# Patient Record
(demographics unavailable — no encounter records)

---

## 2024-10-29 NOTE — PLAN
[FreeTextEntry3] : 1. Requested: -IEP- school service plan -Early Intervention initial evaluation reports:  Psychological, Developmental, Speech therapy, Occupational therapy, Physical therapy, etc -CPSE (Committee on  Special Education)/ school district initial evaluation reports: Psychological, Developmental/Educational, Speech therapy, Occupational therapy, Physical therapy, etc -Teacher comments and Teacher ECI form -Parent ECI form  2. Next visit: -relisten as murmur heard today - if heard again can follow up w/ PMD -review requested info  3. Discussed: -noted some face scrunching and lining up of toys - monitor next visit if scrunching attn seeking/being cute/silly or not  4. Following completion of this two part initial consultation, a consultation report will be completed and sent out to the child's pediatrician and the child's parents.

## 2024-10-29 NOTE — REASON FOR VISIT
[FreeTextEntry1] :   Introduction: I had the pleasure of seeing aRzia "Constanza Young, age ~3.2 years, for a consultation.  Razia was accompanied today by her mother.  HISTORY OF PRESENTING CONCERNS:  Concerns noted on our intake paperwork include: -possible anxiety or ADHD -speech delays and explosive tantrums -appears very anxious -hard to calm/soothe -struggles with express communication and makes needs known -anxiety, diff transitioning, emotional regulation  Today's Concerns: -global delays -low muscle tone - multiple nursemaid elbows -speech delays -anxiety/OCD type behaviors -no concern for  ASD -low tone and Anxiety -developmental monitoring given fmhx  Anxiety concern: -at night during nighttime routine wanted to ask everyone's eye color in the family or hair color or hair color - stopped -if routine changed repeatedly asks about it, anticipatory nervousness - if already experiences would be calmer/fewer questions -if doesn't want to do something hard to persuade - doesn't necc seem extreme -entered school ok first day -no rituals  ADHD concern:   Sensory seeking    Language/Communication: -EI started at 24 months, she had ~5 words at the time -speaks in sentences -able to follow multiple step instruction -demonstrates shared enjoyment and joint attention -some repeating/quoting others during her play - repeating things heard from others, also novel narrating  Behavioral: -very stubborn, independent -generally cooperative if ask in the 'right way' -meltdowns improving overtime  Emotional: -picks up on other's emotions - asks/comments about it  Social/Play Skills: -strong social interest, enjoys playing with older kids -usually plays with toys in their intended manner -frequent upset when playing with siblings, plays nicely by herself when alone -with similarly aged peers - plays interactively and goes well -very socially engaged/cued in -thoughtful (asked for 4 stickers end of visit so can give one to each sibling)  Atypical Behaviors/Sensory: -frequent face scrunching - parent thinks likely to be silly -generally doesn't line toys up (during visit first lined up all the  toys - mother notes doesn't typically do) -demonstrates pretend/imaginative play. Likes dresses   ADAPTIVE FUNCTIONING:  Self-Care: -helps w/ dressing process, can partly dress herself -good w/ utensils, drinks from a cup  Toileting: -working on in school  Feeding: -fair variety. No major brand issues  Sleep: -no major issues -asleep around 8:00pm, wakes around 6:30am. No snoring.  EDUCATIONAL HISTORY/INTERVENTIONS: Academics: knows some letters and numbers. Knows colors and shapes. Sings alphabet song.   Prior to 9/2024 was watched at home with a   School Name: [employer] (9/2024-) Grade: PreK Services: -SC 12:1 -SLT  Approved for 15 person class, but they didn't have one. CPSE wanted to have her in [place] with SEIT, parent opted for more restrictive 12:1  PREVIOUS ASSESSMENTS/SERVICES:  *EI (1 yo): -PT -OT -SLT  *CPSE (5/2024 evaluated):  -qualified for 15:1, placed in 12:1 as was available -SLT -mother planning on having her reevaluated for OT and PT  MEDICAL HISTORY:  Current Medications: -none  Medication History: -none for behavior  Allergies: -none  Birth History: -FT, vaginal delivery, no major complications  ROS: A 10-point review of systems was performed. No concerns regarding vision and hearing. No cardiovascular, respiratory, gastrointestinal, renal, endocrine, or neurologic concerns. -hypotonia, and ligamentous laxity -eczema  Ortho (6/2024): -ligamentous laxity  Audiology: -screened by PMD  Vision: -no concerns   Hospitalizations/ Surgeries: -none   FAMILY HISTORY: Her father is an . Has a Masters Her mother is a PA - [employer] - Internal Medicine She has a sister (2017) and 2 brothers (2019)  There is a family history/extended family history of: -ADHD: father, multiple family members -high functioning ASD: twin brothers (2019) -APD/ADHD sxs: sister  -Dyslexia: MGF  -Anxiety: mother and addtl family members -APD symptoms: maternal aunt -Asperger symptoms: some symptoms MGF and MGGF -Cardiomyopathy: MGM - asymptomatic, genetic testing negative -Atypicalities on EKG: mother - monitoring  There is no family history/extended family history of depression, , OCD, schizophrenia, bipolar disorder, intellectual disability learning disabilities  SOCIAL HISTORY: -lives with her immediate family   PE (10/29/24): Constitutional: Well appearing. No acute distress. Dysmorphic Features: No dysmorphic features noted. Skin: 1 large hyperpigmented macule right lower leg lateral Eyes: Pupils, equal, round, reactive to light. Extraocular movements grossly intact. Ear, Nose, Mouth, Throat: Moist mucous membranes. No pharyngeal injection. Normal appearing uvula. Somewhat high palate. Cardiovascular: S1,S2. Flow murmur lower left lateral Respiratory: Clear to auscultation bilaterally. Gastrointestinal: Soft, non-tender, non-distended. No hepatosplenomegaly. Normoactive bowel sounds. Genitourinary:  Normal appearing female external genitalia. Musculoskeletal/Neuro: fair strength, FROM of extremities. Decreased tone Motor: inturning of feet  Observations (10/29/24 ): -fairly robust shared enjoyment -demonstrated fair eye contact and joint attention -initially lined up all the toys but then played with them (mother reports generally doesn't line up items) -fairly frequent face scrunching - at time seemingly for attn/being cute -cooperative, friendly -followed my gaze -fair play with toys -no scripting/echolalia appreciated -no visual inspection noted -spoke in sentences, some articulation weaknesses (says Elton for her sister when her name is Nakia) -followed instructions fairly -responsive to social  bids -social rapport established   * LABORATORY/TEST RESULTS/DEVELOPMENTAL ASSESSMENTS:  [place] Assessment  -Informant: Caregiver on Intake Form -Inattention: 4/9 -Hyperactivity/Impulsivity: 8/9

## 2024-11-19 NOTE — REASON FOR VISIT
[FreeTextEntry1] :   Introduction: I had the pleasure of seeing Razia "Constanza Young, age ~3.2 years, for a consultation.  Razia was accompanied today by her parents.  HISTORY OF PRESENTING CONCERNS:  Concerns noted on our intake paperwork include: -possible anxiety or ADHD -speech delays and explosive tantrums -appears very anxious -hard to calm/soothe -struggles with express communication and making needs known -anxiety, diff transitioning, emotional regulation  Today's Concerns: -global delays -low muscle tone - multiple nursemaid elbows -speech delays -anxiety/OCD type behaviors -no concern for ASD -low tone and Anxiety -developmental monitoring given fmhx  Anxiety concern: -at night during nighttime routine wanted to ask everyone's eye color in the family or hair color - stopped -if routine changed repeatedly asks about it, anticipatory nervousness - if already experiences would be calmer/fewer questions -if doesn't want to do something hard to persuade - doesn't necc seem extreme -entered school ok first day -no rituals -whatever mood she wakes in the morning - hard to change -ruminates on thoughts/things she wants - either parents give in or she tantrums - which can long. Likes going to specific stores - [employer] - and then often gets a toy/treat there - if denied something generally ok - or if given options of small items instead of a larger toy she is usually receptive. Difficulty delaying her wanting to go there -Requests certain food - takes a bite, and then doesn't want the rest  ADHD concern: -often changes from activity to activity -Mealtime: can sit for breakfast -Impulsive: breaking sibling toys, hitting/bothering siblings, extreme impulsivity -not overly hyperactivity -easily distracted  Sensory: -likes slime, water, sand -asks for a bath when in a bad mood -dislikes washing / shampooing hair/brushing -cut hair to avoid the issues  - now cut the hair, now much better - she sprays her hair and picks clips to put in the hair -frequent changing into PJs. Changes clothing frequently as doesn't like it wet or dirty -dislikes loud noises, dislikes large crowds -sensitive to smells  School: -doing fairly well behaviorally, academically, and socially  Language/Communication: -EI started at 24 months, she had ~5 words at the time -speaks in sentences -able to follow multiple step instruction -demonstrates shared enjoyment and joint attention -some repeating/quoting others during her play - repeating things heard from others, also novel narrating  Behavioral: -very stubborn, independent -generally cooperative if ask in the 'right way' -meltdowns improving overtime  Emotional: -picks up on other's emotions - asks/comments about it  Social/Play Skills: -strong social interest, enjoys playing with older kids -usually plays with toys in their intended manner -frequent upset when playing with siblings, plays nicely by herself when alone -with similarly aged peers - plays interactively and goes well -very socially engaged/cued in -thoughtful (asked for 4 stickers end of visit so can give one to each sibling)  Atypical Behaviors/Sensory: -frequent face scrunching - parent thinks likely to be silly -generally doesn't line toys up (during visit first lined up all the  toys - mother notes doesn't typically do) -demonstrates pretend/imaginative play. Likes dresses   ADAPTIVE FUNCTIONING:  Self-Care: -helps w/ dressing process, can partly dress herself -good w/ utensils, drinks from a cup  Toileting: -working on in school  Feeding: -fair variety. No major brand issues  Sleep: -no major issues -asleep around 8:00pm, wakes around 6:30am. No snoring.  EDUCATIONAL HISTORY/INTERVENTIONS: Academics: knows some letters and numbers. Knows colors and shapes. Sings alphabet song.   Prior to 2024 was watched at home with a   School Name: [employer] (2024-) Grade: PreK Services: -SC 12:1 -SLT -Parent training   Approved for 15 person class, but they didn't have one. CPSE wanted to have her in [place] with SEIT, parent opted for more restrictive 12:1  PREVIOUS ASSESSMENTS/SERVICES:  *EI (3 yo): -PT -OT -SLT  *CPSE (2024 evaluated):  -qualified for 15:1, placed in 12:1 as was available -SLT -mother planning on having her reevaluated for OT and PT  MEDICAL HISTORY:  Current Medications: -none  Medication History: -none for behavior  Allergies: -none  Birth History: -FT, vaginal delivery, no major complications  ROS: A 10-point review of systems was performed. No concerns regarding vision and hearing. No cardiovascular, respiratory, gastrointestinal, renal, endocrine, or neurologic concerns. -hypotonia, and ligamentous laxity -eczema  Ortho (2024): -ligamentous laxity  Audiology: -screened by PMD  Vision: -no concerns   Hospitalizations/ Surgeries: -none   FAMILY HISTORY: Her father is an . Has a Masters Her mother is a PA - [employer] - Internal Medicine She has a sister (2017) and 2 brothers (2019)  There is a family history/extended family history of: -ADHD: father, multiple family members -high functioning ASD: twin brothers (2019) -APD/ADHD sxs: sister  -Dyslexia: MGF  -Anxiety: mother and addtl family members -APD symptoms: maternal aunt -Asperger symptoms: some symptoms MGF and MGGF -Cardiomyopathy: MGM - asymptomatic, genetic testing negative -Atypicalities on EKG: mother - monitoring -Father: murmur when younger  There is no family history/extended family history of depression, , OCD, schizophrenia, bipolar disorder, intellectual disability learning disabilities  SOCIAL HISTORY: -lives with her immediate family   PE (10/29/24): Constitutional: Well appearing. No acute distress. Dysmorphic Features: No dysmorphic features noted. Skin: 1 large hyperpigmented macule right lower leg lateral Eyes: Pupils, equal, round, reactive to light. Extraocular movements grossly intact. Ear, Nose, Mouth, Throat: Moist mucous membranes. No pharyngeal injection. Normal appearing uvula. Somewhat high palate. Cardiovascular: S1,S2. Flow murmur lower left lateral Respiratory: Clear to auscultation bilaterally. Gastrointestinal: Soft, non-tender, non-distended. No hepatosplenomegaly. Normoactive bowel sounds. Genitourinary:  Normal appearing female external genitalia. Musculoskeletal/Neuro: fair strength, FROM of extremities. Decreased tone Motor: inturning of feet  Observations (10/29/24 ): -fairly robust shared enjoyment -demonstrated fair eye contact and joint attention -initially lined up all the toys but then played with them (mother reports generally doesn't line up items) -fairly frequent face scrunching - at time seemingly for attn/being cute -cooperative, friendly -followed my gaze -fair play with toys -no scripting/echolalia appreciated -no visual inspection noted -spoke in sentences, some articulation weaknesses (says Elton for her sister when her name is Nakia) -followed instructions fairly -responsive to social  bids -social rapport established  (2024): -lined up dinosaurs -less interested in playing today -perseverated on saying she was thirsty and wanted to go home and was tired (didn't sleep well prior night) -fair EC, JA, shared enjoyment -scrunched face but in a social manner -minor articulation weaknesses, spoke in sentences -playful at moments  * LABORATORY/TEST RESULTS/DEVELOPMENTAL ASSESSMENTS:  ***EI (~24 months, 2023): (observations of evaluations reviewed, in general no major red flags for ASD or other disorders noted on review of behavioral observations) __PES2: 	-all scores avg or above with exception of weaker Self-help skills __DAYC2: 	-all scores avg or above with exception of weaker adaptive and soc-emot skills __PLS5: 	-Recpt: 82 	-Expr: 74 	-Total lan __PDMS2: 	-FMQ: 67 	-GMQ: 76   ***CPSE Eval (~2.9 yo, 2024): *Psychological: -presented as slightly anxious -smiled at evaluator when greeted, transitioned well -engaging, cooperative, easily understand test instructions -appr EC -demonstrated pretend play -quickly gave up on  task when unsure of an answer __Wechsler  and Primary Scale of Intelligence-4th Ed (WPPSI-IV) (SS): 	-Verbal Comprehension Index (VCI): 103 	-Visual Spatial Index (VSI): 100 	-Working Memory Index (WMI): 113 	-Full Scale IQ:  105 *SLT: -articulation errors -moderate delays in articulation and oral motor skills -age appr expr/recept skills __Kaufman Speech Praxis Test:	 	-Oral Movement Level:  72 __Goldman Fristoe Test of Articulation 3: 	-Score:  76 __Preschool Language Scale-5 (PLS - 5)(SS)(SD of 15): 	-Auditory Comp: 87 	-Expressive Comm:  85 	-Total Language: 85 *OT: -visual and auditory distractibility noted in busy environment __PDMS2: 	-FMQ: 88 (Grasping 6, VMI 10) *PT: -established, maintained EC, avoided diff tasks by complaining she was tired __PDMS2: 	-GMQ: 89  [place] Assessment  -Informant: Caregiver on Intake Form -Inattention:  -Hyperactivity/Impulsivity:

## 2024-11-19 NOTE — PLAN
[FreeTextEntry3] : A.  1.  Follow up is offered in 6-12 months X 30min  Requested shortly prior to next visit: -IEP -any new evaluation reports -Annual review progress reports -Teacher comments and Teacher ECI form -Parent ECI form  2. Murmur: -cardiac murmur appreciated on exam -recc following up with PMD - can consider Cardiology consultation  3. Behavior: -consider working with parent training/behavioral therapist to address behavioral challenges. If significant anxiety overtime recommend working with a Cognitive Behavioral Therapist (abct.org) to address  4. School services: -reportedly doing well in her current setting -next year may benefit from a less restrictive environment - such as an integrated classroom or a general ed program with SEIT services  B. Anxiousness resources: Websites: -anxietybc.Zhanzuo  -Worrywisekids.org -childanxiety.net -Association for Behavioral & Cognitive Therapies (www.abct.org). -Anxiety Disorders Association of [place]:  www.adaa   Helpful resources for therapists and school counselors include: "Up and Down the Worry [place]" by Gertrude and "Modular Cognitive Behavioral Therapy for Childhood Anxiety Disorders" by Deepa.  Books for Families:  -"What to do When You Worry Too Much: A Kid's Guide to Overcoming Anxiety" by Ivis Pack  -"What to do When Your Brain Gets Stuck: A Kid's Guide to Overcoming OCD" by Ivis Pack and Natalie Felipe   "Worried No More: Help and Hope for Anxious Children" [place], [place]: A [facility] by Tripp Lindsey (2005).    "Teaching your Child the Language of Social Success" by Fei Ugalde Nowicki (1996).  "The New Social Story Book: Illustrated Edition" by Yvonne Michel (2000). www.theCentrePathaycenter.org  "Treating childhood and adolescent anxiety: A guide for caregivers." Nazario&Sons, [place], [place]. by Rosie Cassidy and Juni Kim. (2013).  "Freeing Your Child from Anxiety: Powerful, Practical Solutions to Overcome Your Child's Fears, Worries, and Phobias" by  Joyce Ladd (2004).  "Helping Your Anxious Child: A Step by Step Guide for Parents second edition" by Stalin Yoo, Nina Laureano, Anette Landrum Lyneham and Yue Espinal (2008)  "Think Good-Feel Good: A Cognitive Behavioral Workbook for Children" by David Garcia (2002).

## 2025-05-01 NOTE — DISCUSSION/SUMMARY
[FreeTextEntry1] : KEVIN has a normal cardiac exam, electrocardiogram and echocardiogram.  I reassured the patient and her  family that KEVIN's heart is structurally and functionally normal without any evidence of a cardiac abnormality.  I placed a 14 day Holter in the hope of capturing some of the described episodes and will f/u over the phone with the results. All physical activities may be performed without restriction and she should return at ~the time of puberty for further screening of HCM.   [Needs SBE Prophylaxis] : [unfilled] does not need bacterial endocarditis prophylaxis [May participate in all age-appropriate activities] : [unfilled] May participate in all age-appropriate activities.

## 2025-05-01 NOTE — HISTORY OF PRESENT ILLNESS
[FreeTextEntry1] : KEVIN is a 3 year female  who presents for cardiac evaluation in the setting of a family history of Hypertrophic cardiomyopathy, KEVIN's maternal grandmother was recently diagnosed with hypertrophic cardiomyopathy (genetics negative) but is clinically well and NIYAH's mother has a similar ECG as her mother with an MRI that revealed an area of fibrosis in the heart, though she was told that at this point she does not have hypertrophic cardiomyopathy. Interestingly, KEVIN's mother was seen by cardiology in her teens and had a normal ECG at that time (reviewed by me). KEVIN's mother states that she occasionally complains that her heart is  'beeping' in her chest, occurring primarily at home, multiple times per week. The episodes are characterized as fast and quiet, without associated pain. The palpitations do not seem to bother the patient or cause fear, but she does notice them. Lastly, on a recent evaluation by Dr. Benedict noted a heart murmur was heard which was felt to be an innocent murmur.

## 2025-05-01 NOTE — CARDIOLOGY SUMMARY
[Today's Date] : [unfilled] [FreeTextEntry1] : Normal sinus rhythm without preexcitation or ectopy. Heart rate (bpm): 86 [FreeTextEntry2] : 1. Normal left ventricular size, morphology and systolic function. 2. Normal right ventricular morphology with qualitatively normal size and systolic function. 3. No pericardial effusion.  [de-identified] : Placed for 2 weeks.

## 2025-05-01 NOTE — CONSULT LETTER
[Today's Date] : [unfilled] [Name] : Name: [unfilled] [] : : ~~ [Today's Date:] : [unfilled] [Dear  ___:] : Dear Dr. [unfilled]: [Consult] : I had the pleasure of evaluating your patient, [unfilled]. My full evaluation follows. [Consult - Single Provider] : Thank you very much for allowing me to participate in the care of this patient. If you have any questions, please do not hesitate to contact me. [Sincerely,] : Sincerely, [FreeTextEntry4] : JOSE F CARRASCO MD [de-identified] : Jose Galloway MD, FAAP, FACC  Pediatric Cardiologist  of Pediatrics [facility]

## 2025-05-01 NOTE — REASON FOR VISIT
[Initial Consultation] : an initial consultation for [Murmurs] : a murmur [Mother] : mother [Father] : father [Medical Records] : medical records [FreeTextEntry3] : Screening for Cardiovascular Disorders due to family history

## 2025-06-10 NOTE — REASON FOR VISIT
[FreeTextEntry1] :   I had the pleasure of meeting with Razia Young (Adira), for a follow up appointment. Accompanied today by her mother.    Introduction:     Initial Consultation Assessment (2024):   Razia Young (Adira), age ~3.2 years, presented for consultation for concerns regarding anxiety, low tone, developmental delays and developmental monitoring. Family history of Autism and Asperger symptoms.  Delays: -receiving CPSE services in a 12:1 [employer] classroom, making progress  Hypotonia noted  Anxiety concerns: -some mild anxiety symptoms reported  Behavior: -perseverates on things she wants, may tantrum if doesn't receive -some behavioral challenges -sensory issues  ADHD concern: -impulsive -easily distracted at times  Past testing (~2.9 yo) demonstrated average range cognitive abilities, low avg speech/language skills with weaknesses in articulation, and low average fine and gross motor skills.  Overall Gómez's presentation is consistent with the following:  *Childhood Communication Disorder -given articulation issues, although these are improving overtime  *Anxiousness: -some anxiousness although doesn't appear to reach level of a disorder at this time  *Hypotonia  *ADHD concern: -some ADHD symptoms. To continue to monitor as she ages  *Presentation does not appear consistent with Autism at this time. While there are some sensory issues and she can perseverate, she also demonstrates intact basic core social communication/interaction skills at this time. To continue to monitor        INTERIM HISTORY:    Anxiety concern: -at night during nighttime routine wanted to ask everyone's eye color in the family or hair color - stopped -if routine changed repeatedly asks about it, anticipatory nervousness - if already experiences would be calmer/fewer questions -if doesn't want to do something hard to persuade - doesn't necc seem extreme -entered school ok first day -no rituals -whatever mood she wakes in the morning - hard to change -ruminates on thoughts/things she wants - either parents give in or she tantrums - which can long. Likes going to specific stores - [employer] - and then often gets a toy/treat there - if denied something generally ok - or if given options of small items instead of a larger toy she is usually receptive. Difficulty delaying her wanting to go there -Requests certain food - takes a bite, and then doesn't want the rest __2025: -perseverates on anything she wants until gets it, gets stuck on it - no longer having related tantrums, gets stuck -School: quiet, shy, doesn't engage much in school -needs a lot of support and encouragement- doesn't greet someone if come into the class - very difft at home - much more outgoing   ADHD concern: -often changes from activity to activity -Mealtime: can sit for breakfast -Impulsive: breaking sibling toys, hitting/bothering siblings, extreme impulsivity -not overly hyperactivity -easily distracted __2025: -still very impulsive - in past explosive outbursts - but has improved  Sensory: -likes slime, water, sand -asks for a bath when in a bad mood -dislikes washing / shampooing hair/brushing -cut hair to avoid the issues - now cut the hair, now much better - she sprays her hair and picks clips to put in the hair -frequent changing into PJs. Changes clothing frequently as doesn't like it wet or dirty -dislikes loud noises, dislikes large crowds -sensitive to smells __2025: -sensitive to noises   School: __: -doing fairly well behaviorally, academically, and socially  Language/Communication: -EI started at 24 months, she had ~5 words at the time -speaks in sentences -able to follow multiple step instruction -demonstrates shared enjoyment and joint attention -some repeating/quoting others during her play - repeating things heard from others, also novel narrating __2025: -very expressive, complex thoughts can be explained -can reason with her more than in the past  Behavioral: -very stubborn, independent -generally cooperative if ask in the 'right way' -meltdowns improving overtime __2025: -fewer tantrums/outbursts  Emotional: -picks up on other's emotions - asks/comments about it  Social/Play Skills: -strong social interest, enjoys playing with older kids -usually plays with toys in their intended manner -frequent upset when playing with siblings, plays nicely by herself when alone -with similarly aged peers - plays interactively and goes well -very socially engaged/cued in -thoughtful (asked for 4 stickers end of visit so can give one to each sibling) __2025: -does well with playdates for hours, plays nicely   SCHOOL HISTORY/DEVELOPMENTAL SERVICES:  Academics: knows some letters and numbers. Knows colors and shapes. Sings alphabet song. __2025: doing well  Prior to 2024 was watched at home with a   School Name: [employer] (2024-) Grade: PreK Services: -SC 12:1 -SLT -Parent training -ESY  Approved for 15 person class, but they didn't have one. CPSE wanted to have her in [place] with SEIT, parent opted for more restrictive 12:1  Next year: -remain in [employer] - will be in Integrated 15:1 -likely adding counseling once a week  PREVIOUS ASSESSMENTS/SERVICES:  *EI (3 yo): -PT -OT -SLT  *CPSE (2024 evaluated): -qualified for 15:1, placed in 12:1 as was available -SLT -mother planning on having her reevaluated for OT and PT  CURRENT FUNCTIONING/HISTORY OF PRESENTING CONCERNS:  ******As noted during her initial consultation******  Concerns noted on our intake paperwork include: -possible anxiety or ADHD -speech delays and explosive tantrums -appears very anxious -hard to calm/soothe -struggles with express communication and making needs known -anxiety, diff transitioning, emotional regulation  Today's Concerns: -global delays -low muscle tone - multiple nursemaid elbows -speech delays -anxiety/OCD type behaviors -no concern for ASD -low tone and Anxiety -developmental monitoring given fmhx   Atypical Behaviors/Sensory: -frequent face scrunching - parent thinks likely to be silly -generally doesn't line toys up (during visit first lined up all the toys - mother notes doesn't typically do) -demonstrates pretend/imaginative play. Likes dresses   ADAPTIVE FUNCTIONING:  Self-Care: -helps w/ dressing process, can partly dress herself -good w/ utensils, drinks from a cup  Toileting: -working on in school  Feeding: -fair variety. No major brand issues  Sleep: -no major issues -asleep around 8:00pm, wakes around 6:30am. No snoring.   *************   *MEDICAL HISTORY:    Current Medications: -none  Medication History: -none for behavior  Allergies: -none   UPDATED PAST MEDICAL HISTORY:  There have been no recent major medical changes.    Birth History: -FT, vaginal delivery, no major complications  ROS: A 10-point review of systems was performed. No concerns regarding vision and hearing. No cardiovascular, respiratory, gastrointestinal, renal, endocrine, or neurologic concerns. -hypotonia, and ligamentous laxity -eczema  Ortho (2024): -ligamentous laxity  Audiology: -screened by PMD  *Cardiology (): - murmur -mother reports Holter had no concerning findings -structurally normal heart  Vision: -no concerns  Hospitalizations/ Surgeries: -none   FAMILY HISTORY: Her father is an . Has a Masters Her mother is a PA - [employer] - Internal Medicine She has a sister () and 2 brothers ()  There is a family history/extended family history of: -ADHD: father, multiple family members -high functioning ASD: twin brothers (2019) -APD/ADHD sxs: sister -Dyslexia: MGF -Anxiety: mother and addtl family members -APD symptoms: maternal aunt -Asperger symptoms: some symptoms MGF and MGGF -Cardiomyopathy: MGM - asymptomatic, genetic testing negative -Atypicalities on EKG: mother - monitoring -Father: murmur when younger  There is no family history/extended family history of depression, , OCD, schizophrenia, bipolar disorder, intellectual disability learning disabilities  SOCIAL HISTORY: -lives with her immediate family   PE (10/29/24): Constitutional: Well appearing. No acute distress. Dysmorphic Features: No dysmorphic features noted. Skin: 1 large hyperpigmented macule right lower leg lateral Eyes: Pupils, equal, round, reactive to light. Extraocular movements grossly intact. Ear, Nose, Mouth, Throat: Moist mucous membranes. No pharyngeal injection. Normal appearing uvula. Somewhat high palate. Cardiovascular: S1,S2. Flow murmur lower left lateral Respiratory: Clear to auscultation bilaterally. Gastrointestinal: Soft, non-tender, non-distended. No hepatosplenomegaly. Normoactive bowel sounds. Genitourinary: Normal appearing female external genitalia. Musculoskeletal/Neuro: fair strength, FROM of extremities. Decreased tone Motor: inturning of feet  Observations (10/29/24 ): -fairly robust shared enjoyment -demonstrated fair eye contact and joint attention -initially lined up all the toys but then played with them (mother reports generally doesn't line up items) -fairly frequent face scrunching - at time seemingly for attn/being cute -cooperative, friendly -followed my gaze -fair play with toys -no scripting/echolalia appreciated -no visual inspection noted -spoke in sentences, some articulation weaknesses (says Elton for her sister when her name is Nakia) -followed instructions fairly -responsive to social bids -social rapport established (2024): -lined up dinosaurs -less interested in playing today -perseverated on saying she was thirsty and wanted to go home and was tired (didn't sleep well prior night) -fair EC, JA, shared enjoyment -scrunched face but in a social manner -minor articulation weaknesses, spoke in sentences -playful at moments __2025: -lined up dinosaurs -social rapport established,  good EC, shared enjoyment -spoke in multiword utterances, friendly  * LABORATORY/TEST RESULTS/DEVELOPMENTAL ASSESSMENTS:  ***EI (~24 months, 2023): (observations of evaluations reviewed, in general no major red flags for ASD or other disorders noted on review of behavioral observations) __PES2:  -all scores avg or above with exception of weaker Self-help skills __DAYC2:  -all scores avg or above with exception of weaker adaptive and soc-emot skills __PLS5:  -Recpt: 82  -Expr: 74  -Total lan __PDMS2:  -FMQ: 67  -GMQ: 76   ***CPSE Eval (~2.9 yo, 2024): *Psychological: -presented as slightly anxious -smiled at evaluator when greeted, transitioned well -engaging, cooperative, easily understand test instructions -appr EC -demonstrated pretend play -quickly gave up on task when unsure of an answer __Wechsler  and Primary Scale of Intelligence-4th Ed (WPPSI-IV) (SS):  -Verbal Comprehension Index (VCI): 103  -Visual Spatial Index (VSI): 100  -Working Memory Index (WMI): 113  -Full Scale IQ: 105 *SLT: -articulation errors -moderate delays in articulation and oral motor skills -age appr expr/recept skills __Kaufman Speech Praxis Test:   -Oral Movement Level: 72 __Goldman Fristoe Test of Articulation 3:  -Score: 76 __Preschool Language Scale-5 (PLS - 5)(SS)(SD of 15):  -Auditory Comp: 87  -Expressive Comm: 85  -Total Language: 85 *OT: -visual and auditory distractibility noted in busy environment __PDMS2:  -FMQ: 88 (Grasping 6, VMI 10) *PT: -established, maintained EC, avoided diff tasks by complaining she was tired __PDMS2:  -GMQ: 89  [place] Assessment  -Informant: Caregiver on Intake Form -Inattention:  -Hyperactivity/Impulsivity: .

## 2025-06-10 NOTE — PLAN
[FreeTextEntry3] : 1.  Follow up is offered with Dr. Strauss ~Jan - March  Requested shortly prior to next visit: - next year's teacher -IEP -any new evaluation reports -Annual review progress reports -Teacher comments and Teacher ECI form -Parent ECI form  2. School services: -parent considering if will consider holding back after next school yr as will be one of the youngest - discussed judging academic/behavior/social fxing -consider push in SLT/Counseling if appropriate to assist with engagement in the classroom -to attend integrated 15:1 classroom  Previous Recommendations:  A.  * Behavior: -consider working with parent training/behavioral therapist to address behavioral challenges. If significant anxiety overtime recommend working with a Cognitive Behavioral Therapist (abct.org) to address  * School services: -reportedly doing well in her current setting -next year may benefit from a less restrictive environment - such as an integrated classroom or a general ed program with SEIT services  B. Anxiousness resources: Websites: -anxietybc.[employer] -Worrywisekids.org -childanxiety.net -Association for Behavioral & Cognitive Therapies (www.abct.org). -Anxiety Disorders Association of [place]: www.adaa   Helpful resources for therapists and school counselors include: "Up and Down the Worry [place]" by Gertrude and "Modular Cognitive Behavioral Therapy for Childhood Anxiety Disorders" by Deepa.  Books for Families:  -"What to do When You Worry Too Much: A Kid's Guide to Overcoming Anxiety" by Ivis Pack  -"What to do When Your Brain Gets Stuck: A Kid's Guide to Overcoming OCD" by Ivis Pack and Natalie Felipe   "Worried No More: Help and Hope for Anxious Children" [place], [place]: A [facility] by Tripp Lindsey (2005).  "Teaching your Child the Language of Social Success" by Fei Ugalde Nowicki (1996).  "The New Social Story Book: Illustrated Edition" by Yvonne Michel (2000). www.theTeevoxer.org  "Treating childhood and adolescent anxiety: A guide for caregivers." [employer], [place], [place]. by Rosie Cassidy and Juni Spaulding (2013).  "Freeing Your Child from Anxiety: Powerful, Practical Solutions to Overcome Your Child's Fears, Worries, and Phobias" by Joyce Ladd (2004).  "Helping Your Anxious Child: A Step by Step Guide for Parents second edition" by Stalin Yoo, Nina Laureano, Kenya Leonardo, Anette Cote and Yue Espinal (2008)  "Think Good-Feel Good: A Cognitive Behavioral Workbook for Children" by David Garcia (2002).   Introduction: I had the pleasure of seeing Razia "Gómez" Hannah, age ~3.2 years, for a consultation. Razia was accompanied today by her parents.